# Patient Record
Sex: FEMALE | Race: WHITE
[De-identification: names, ages, dates, MRNs, and addresses within clinical notes are randomized per-mention and may not be internally consistent; named-entity substitution may affect disease eponyms.]

---

## 2020-06-16 ENCOUNTER — HOSPITAL ENCOUNTER (OUTPATIENT)
Dept: HOSPITAL 62 - SC | Age: 70
Discharge: HOME | End: 2020-06-16
Attending: OPHTHALMOLOGY
Payer: MEDICARE

## 2020-06-16 DIAGNOSIS — I10: ICD-10-CM

## 2020-06-16 DIAGNOSIS — H44.9: ICD-10-CM

## 2020-06-16 DIAGNOSIS — H25.813: Primary | ICD-10-CM

## 2020-06-16 DIAGNOSIS — Z87.891: ICD-10-CM

## 2020-06-16 DIAGNOSIS — G47.33: ICD-10-CM

## 2020-06-16 DIAGNOSIS — H02.831: ICD-10-CM

## 2020-06-16 DIAGNOSIS — E03.9: ICD-10-CM

## 2020-06-16 DIAGNOSIS — Z79.899: ICD-10-CM

## 2020-06-16 DIAGNOSIS — H02.834: ICD-10-CM

## 2020-06-16 DIAGNOSIS — K21.9: ICD-10-CM

## 2020-06-16 DIAGNOSIS — Z88.5: ICD-10-CM

## 2020-06-16 DIAGNOSIS — E78.00: ICD-10-CM

## 2020-06-16 PROCEDURE — 66984 XCAPSL CTRC RMVL W/O ECP: CPT

## 2020-06-16 PROCEDURE — V2632 POST CHMBR INTRAOCULAR LENS: HCPCS

## 2020-06-16 PROCEDURE — 82962 GLUCOSE BLOOD TEST: CPT

## 2020-06-16 RX ADMIN — DORZOLAMIDE HYDROCHLORIDE AND TIMOLOL MALEATE PRN DROP: 20; 5 SOLUTION OPHTHALMIC at 00:00

## 2020-06-16 RX ADMIN — BESIFLOXACIN PRN DROP: 6 SUSPENSION OPHTHALMIC at 11:28

## 2020-06-16 RX ADMIN — TETRACAINE HYDROCHLORIDE PRN DROP: 5 SOLUTION OPHTHALMIC at 11:29

## 2020-06-16 RX ADMIN — TROPICAMIDE PRN DROP: 10 SOLUTION/ DROPS OPHTHALMIC at 11:48

## 2020-06-16 RX ADMIN — DORZOLAMIDE HYDROCHLORIDE AND TIMOLOL MALEATE PRN DROP: 20; 5 SOLUTION OPHTHALMIC at 12:20

## 2020-06-16 RX ADMIN — TETRACAINE HYDROCHLORIDE PRN DROP: 5 SOLUTION OPHTHALMIC at 11:48

## 2020-06-16 RX ADMIN — TROPICAMIDE PRN DROP: 10 SOLUTION/ DROPS OPHTHALMIC at 11:38

## 2020-06-16 RX ADMIN — BESIFLOXACIN PRN DROP: 6 SUSPENSION OPHTHALMIC at 12:20

## 2020-06-16 RX ADMIN — TETRACAINE HYDROCHLORIDE PRN DROP: 5 SOLUTION OPHTHALMIC at 11:52

## 2020-06-16 RX ADMIN — TROPICAMIDE PRN DROP: 10 SOLUTION/ DROPS OPHTHALMIC at 11:28

## 2020-06-16 RX ADMIN — BESIFLOXACIN PRN DROP: 6 SUSPENSION OPHTHALMIC at 11:48

## 2020-06-16 RX ADMIN — BESIFLOXACIN PRN DROP: 6 SUSPENSION OPHTHALMIC at 00:00

## 2020-06-16 RX ADMIN — CYCLOPENTOLATE HYDROCHLORIDE AND PHENYLEPHRINE HYDROCHLORIDE PRN DROP: 2; 10 SOLUTION/ DROPS OPHTHALMIC at 11:48

## 2020-06-16 RX ADMIN — CYCLOPENTOLATE HYDROCHLORIDE AND PHENYLEPHRINE HYDROCHLORIDE PRN DROP: 2; 10 SOLUTION/ DROPS OPHTHALMIC at 11:28

## 2020-06-16 RX ADMIN — CYCLOPENTOLATE HYDROCHLORIDE AND PHENYLEPHRINE HYDROCHLORIDE PRN DROP: 2; 10 SOLUTION/ DROPS OPHTHALMIC at 11:38

## 2020-06-16 NOTE — OPERATIVE REPORT
Operative Report-[facility]


Operative Report: 





PREOPERATIVE DIAGNOSIS: Nuclear, cortical and posterior subcapsular cataract, 

right eye


POSTOPERATIVE DIAGNOSIS: Nuclear, cortical and posterior subcapsular cataracts, 

right eye


PROCEDURE: Phacoemulsification and posterior chamber intraocular lens implant, 

right eye


PROCEDURE DATE: [June 16, 2020 ]


SURGEON: Suhas Jones MD Next


ANESTHETIST: [Anny]


ANESTHESIA: Topical with IV sedation next


COMPLICATIONS: None


TISSUE TO PATHOLOGY: None


ESTIMATED BLOOD LOSS: None


INDICATION FOR SURGERY: [Ms. Lopez is a 69 year old female] Who presents to our

clinic complaining of difficulty seeing, to read and drive due to blurry vision 

in both eyes. On examination, she was found to have best corrected visual acuity

of [20/50] in the right eye. Ophthalmoscopy revealed a [+2] nuclear, [+3] 

corneal degeneration, [trace] posterior subcapsular cataract in the right eye 

with normal appearing cornea, vitreous, retina and optic nerve. I discussed the 

findings of the exam with the patient. We discussed the risks, benefits and 

alternatives of cataract extraction and intraocular lens implant in the right ey

e as a means of improving her vision. Risks that were discussed with the patient

 include infection, bleeding, retinal detachment and possible need for 

additional surgery. The patient understands that she may need to wear glasses 

after surgery. After discussion, the patient indicated her interest in having 

this procedure performed by signing an informed witness consent form.


REPORT OF PROCEDURE: On the day of surgery, the patient was given a topical 

application to the right eye to consist of drop of Tetracaine 0.5%, tropicamide 

1%, Cyclomidril, Besivance 0.6% and Acular 0.45%. The patient was then taken to 

the operating room in a supine position in a standard eye bed. Intravenous 

sedation was administered and she was prepped and draped in the standard 

fashion. A timeout was performed to confirm the surgical site. Attention was 

directed to the right eye where a paracentesis was created at the 11:30 position

 at the corneal limbus with a 15 degree blade. The anterior chamber was filled 

with 0.3 mL of 1% methylparaben free lidocaine and after 30 seconds the anterior

 chamber was filled with viscoelastic material. A 3 plane corneal incision was 

then made at the 9 o'clock position at the cornea limbus with a keratome. A 

continuous curvilinear capsulorrhexis was then made in the anterior capsule of 

the lens with a cystotome. The lens was hydrodissected using balanced saline 

solution. The lens nucleus was then removed by phacoemulsification using the 

stop and chop technique. CDE [11.57]. The remaining cortical material was then 

removed from the posterior capsular bag using irrigation and aspiration. The 

posterior capsule bag was filled with viscoelastic material and a lens implant 

was inserted into the posterior capsule bag. I have chosen for this case is a 

one piece acrylic lens from [employer] model [[id number]], serial number 

[[id number]], lens power [16.5]. The lens was removed from its package, 

inspected and found to be free of defects it was loaded into a [place] D 

. The  was passed through the temporal wound and the lens was 

advanced into the posterior capsular bag. The lens implant was centered in the 

posterior capsular bag with the Negley spatula the viscoelastic material was 

removed from the eye using irrigation and aspiration. The wounds were closed by 

stromal hydration and they were tested with the Weck-Miladis sponges and found to 

have no leaks. Intraocular pressure was assessed by manual palpitation found to 

be with in the physiologic range. The drape and speculum were removed. Drops of 

Durezol, Combigan and gatifloxacin were instilled in the right eye. The patient 

was then taken to the recovery room in good condition. The patient tolerated the

 procedure very well. The patient was given a prescription for gatifloxacin, 

Durezol and Ilervo to use every 2 hours while awake today. She will return my 

clinic tomorrow for follow-up evaluation.